# Patient Record
Sex: MALE | Race: WHITE | Employment: OTHER | URBAN - METROPOLITAN AREA
[De-identification: names, ages, dates, MRNs, and addresses within clinical notes are randomized per-mention and may not be internally consistent; named-entity substitution may affect disease eponyms.]

---

## 2022-05-23 ENCOUNTER — HOSPITAL ENCOUNTER (EMERGENCY)
Age: 52
Discharge: HOME | End: 2022-05-23
Attending: EMERGENCY MEDICINE

## 2022-05-23 VITALS
BODY MASS INDEX: 22.73 KG/M2 | RESPIRATION RATE: 18 BRPM | HEIGHT: 68 IN | OXYGEN SATURATION: 100 % | SYSTOLIC BLOOD PRESSURE: 176 MMHG | DIASTOLIC BLOOD PRESSURE: 86 MMHG | WEIGHT: 150 LBS | TEMPERATURE: 97.8 F | HEART RATE: 79 BPM

## 2022-05-23 DIAGNOSIS — K02.9 DENTAL CARIES: ICD-10-CM

## 2022-05-23 DIAGNOSIS — R51.9 RIGHT FACIAL PAIN: Primary | ICD-10-CM

## 2022-05-23 DIAGNOSIS — K08.89 PAIN, DENTAL: ICD-10-CM

## 2022-05-23 PROCEDURE — 99283 EMERGENCY DEPT VISIT LOW MDM: CPT

## 2022-05-23 RX ORDER — CLINDAMYCIN HYDROCHLORIDE 300 MG/1
300 CAPSULE ORAL 4 TIMES DAILY
Qty: 28 CAPSULE | Refills: 0 | Status: SHIPPED | OUTPATIENT
Start: 2022-05-23 | End: 2022-05-30

## 2022-05-23 NOTE — DISCHARGE INSTRUCTIONS
Dentist to soon as possible  Recommend good antibacterial mouthwash  Continued use of Tylenol/Motrin for pain  Antibiotic as prescribed  Worse?   Return to ER

## 2022-05-23 NOTE — ED PROVIDER NOTES
EMERGENCY DEPARTMENT HISTORY AND PHYSICAL EXAM    Date: 5/23/2022  Patient Name: Santiago Fernandez    History of Presenting Illness     Time Seen: 1:35 PM    Chief Complaint   Patient presents with    Dental Pain       History Provided By: Patient    Additional History (Context):   Santiago Fernandez is a 46 y.o. male presents to the emergency room with complaints of right upper and lower gumline pain/dental pain that started on Friday. Patient notes he has bad teeth in the area. Gradual onset of worsening pain. Has been taking Tylenol and ibuprofen for discomfort with little relief. Now it feels swollen. No bleeding or drainage from the area. He is concerned he has infection in the area. Afebrile. No chills or aches. No neck swelling. Denies any jaw stiffness. PCP: None        Past History     Past Medical History:  No past medical history on file. Past Surgical History:  No past surgical history on file. Family History:  No family history on file. Social History:  Social History     Tobacco Use    Smoking status: Not on file    Smokeless tobacco: Not on file   Substance Use Topics    Alcohol use: Not on file    Drug use: Not on file       Allergies:  No Known Allergies      Review of Systems   Review of Systems   Constitutional: Negative for chills and fever. HENT: Positive for dental problem and facial swelling. Negative for trouble swallowing. Neurological: Negative for headaches. All other systems reviewed and are negative. Physical Exam     Vitals:    05/23/22 1224 05/23/22 1225   BP:  (!) 176/86   Pulse: 79    Resp: 18    Temp: 97.8 °F (36.6 °C)    SpO2: 100%    Weight: 68 kg (150 lb)    Height: 5' 8\" (1.727 m)      Physical Exam  Vitals and nursing note reviewed. Constitutional:       Appearance: Normal appearance. He is well-developed, well-groomed and normal weight. HENT:      Mouth/Throat:      Mouth: Mucous membranes are moist. No oral lesions.       Dentition: Abnormal dentition. Dental tenderness and dental caries present. No gingival swelling, dental abscesses or gum lesions. Comments: Poor dentition in the areas described. He has multiple dental caries noted in the right upper and right lower molar region. There are several teeth that have eroded down to the level of the gumline. Several teeth are black. There is no obvious abscess seen. No palpable fluctuance. However upper and lower molars on the right side very tender to percussion. There is no jaw trismus. Eyes:      Extraocular Movements: Extraocular movements intact. Conjunctiva/sclera: Conjunctivae normal.      Pupils: Pupils are equal, round, and reactive to light. Cardiovascular:      Rate and Rhythm: Normal rate and regular rhythm. Heart sounds: Normal heart sounds. Pulmonary:      Effort: Pulmonary effort is normal.      Breath sounds: Normal breath sounds. Musculoskeletal:      Cervical back: Neck supple. Lymphadenopathy:      Cervical: No cervical adenopathy. Skin:     General: Skin is warm. Neurological:      Mental Status: He is alert and oriented to person, place, and time. Psychiatric:         Behavior: Behavior is cooperative. Nursing note and vitals reviewed         Diagnostic Study Results     Labs -   No results found for this or any previous visit (from the past 12 hour(s)). Radiologic Studies   No orders to display     CT Results  (Last 48 hours)    None        CXR Results  (Last 48 hours)    None            Medical Decision Making   I am the first provider for this patient. I reviewed the vital signs, available nursing notes, past medical history, past surgical history, family history and social history. Vital Signs-Reviewed the patient's vital signs. Records Reviewed: Nursing Notes    DDX: Poor dentition, multiple dental caries, probable early infectious process    Procedures:  Procedures    ED Course:   Initial assessment performed.  The patients presenting problems have been discussed, and they are in agreement with the care plan formulated and outlined with them. I have encouraged them to ask questions as they arise throughout their visit. ED Physician Discussion Note:   Patient will be placed on clindamycin to treat probable early infection  He plans on using Tylenol or ibuprofen for pain  Referral to see dentist soon as possible  Worsening symptoms return to ER    Diagnosis and Disposition       DISCHARGE NOTE:  Dagoberto Hartman's  results have been reviewed with him. He has been counseled regarding his diagnosis, treatment, and plan. He verbally conveys understanding and agreement of the signs, symptoms, diagnosis, treatment and prognosis and additionally agrees to follow up as discussed. He also agrees with the care-plan and conveys that all of his questions have been answered. I have also provided discharge instructions for him that include: educational information regarding their diagnosis and treatment, and list of reasons why they would want to return to the ED prior to their follow-up appointment, should his condition change. He has been provided with education for proper emergency department utilization. CLINICAL IMPRESSION:    1. Right facial pain    2. Pain, dental    3. Dental caries        PLAN:  1. D/C Home  2. There are no discharge medications for this patient. 3.   Follow-up Information     Follow up With Specialties Details Why Contact Info    Dentist  Go to  Go see dentist to soon as possible     [facility] EMERGENCY DEPT Emergency Medicine  If symptoms worsen, As needed [address] [id number]  [phone]        ____________________________________     Please note that this dictation was completed with [employer], the [employer] voice recognition software. Quite often unanticipated grammatical, syntax, homophones, and other interpretive errors are inadvertently transcribed by the computer software. Please disregard these errors. Please excuse any errors that have escaped final proofreading.